# Patient Record
(demographics unavailable — no encounter records)

---

## 2025-03-02 NOTE — PHYSICAL EXAM
[No Acute Distress] : no acute distress [Well Nourished] : well nourished [Well Developed] : well developed [Well-Appearing] : well-appearing [Normal Sclera/Conjunctiva] : normal sclera/conjunctiva [PERRL] : pupils equal round and reactive to light [EOMI] : extraocular movements intact [Normal Outer Ear/Nose] : the outer ears and nose were normal in appearance [Normal Oropharynx] : the oropharynx was normal [No JVD] : no jugular venous distention [No Lymphadenopathy] : no lymphadenopathy [Supple] : supple [Thyroid Normal, No Nodules] : the thyroid was normal and there were no nodules present [No Respiratory Distress] : no respiratory distress  [No Accessory Muscle Use] : no accessory muscle use [Clear to Auscultation] : lungs were clear to auscultation bilaterally [Normal Rate] : normal rate  [Regular Rhythm] : with a regular rhythm [Normal S1, S2] : normal S1 and S2 [No Murmur] : no murmur heard [No Abdominal Bruit] : a ~M bruit was not heard ~T in the abdomen [No Varicosities] : no varicosities [Pedal Pulses Present] : the pedal pulses are present [No Edema] : there was no peripheral edema [No Palpable Aorta] : no palpable aorta [No Extremity Clubbing/Cyanosis] : no extremity clubbing/cyanosis [Soft] : abdomen soft [Non Tender] : non-tender [Non-distended] : non-distended [No Masses] : no abdominal mass palpated [No HSM] : no HSM [Normal Bowel Sounds] : normal bowel sounds [Normal Posterior Cervical Nodes] : no posterior cervical lymphadenopathy [Normal Anterior Cervical Nodes] : no anterior cervical lymphadenopathy [No CVA Tenderness] : no CVA  tenderness [No Spinal Tenderness] : no spinal tenderness [No Joint Swelling] : no joint swelling [Grossly Normal Strength/Tone] : grossly normal strength/tone [No Rash] : no rash [Coordination Grossly Intact] : coordination grossly intact [No Focal Deficits] : no focal deficits [Normal Gait] : normal gait [Deep Tendon Reflexes (DTR)] : deep tendon reflexes were 2+ and symmetric [Normal Affect] : the affect was normal [Normal Insight/Judgement] : insight and judgment were intact [de-identified] : /80  BMI: 25.68

## 2025-03-02 NOTE — HEALTH RISK ASSESSMENT
[Little interest or pleasure doing things] : 1) Little interest or pleasure doing things [Feeling down, depressed, or hopeless] : 2) Feeling down, depressed, or hopeless [0] : 2) Feeling down, depressed, or hopeless: Not at all (0) [PHQ-2 Negative - No further assessment needed] : PHQ-2 Negative - No further assessment needed [PHQ-9 Positive] : PHQ-9 Positive [Time Spent: ___ Minutes] : I spent [unfilled] minutes performing a depression screening for this patient. [[id number]] : 0 [No] : does not take

## 2025-03-02 NOTE — HEALTH RISK ASSESSMENT
[Little interest or pleasure doing things] : 1) Little interest or pleasure doing things [Feeling down, depressed, or hopeless] : 2) Feeling down, depressed, or hopeless [0] : 2) Feeling down, depressed, or hopeless: Not at all (0) [PHQ-2 Negative - No further assessment needed] : PHQ-2 Negative - No further assessment needed [PHQ-9 Positive] : PHQ-9 Positive [Time Spent: ___ Minutes] : I spent [unfilled] minutes performing a depression screening for this patient. [[id number]] : 0 [No] : does not take

## 2025-03-02 NOTE — HISTORY OF PRESENT ILLNESS
[FreeTextEntry1] : Annual Wellness examination [de-identified] : 57 yrs old female with history of Migraines, GEORGES for Graves disease 2009, Hypothyroidism, Osteopenia, hyperlipidemia, Cervical herniated disc, 11/2021 uterine myomectomy, Strong FH Graves disease, here for annual wellness examination.  Sees endocrinology for hypothyroidism. Recent history: 7/14/24: [facility] for acute right pyelonephritis and urethritis with bilateral hydronephrosis. On Rocephin IV and 12 days Cefpodoxime.  CT abd/pelvis 7/14/24 :  Mild proximal bilateral hydroureteronephrosis, with probable right sided pyeloureteritis.  New history: OAB- noticed 7/2024 - 1-2 times up at night and daytime normal. Also drinking a lot more fluid since hospitalization 7/2024. Migraines much improved- every 3 mos or less. Labwork: 2/26/25 , TG 99, GFR 74, , TFTs WNL, A1c 5.8, urine screen neg Allergies: Pcn rash, corn -rash facial Medication :  Synthroid 88 micrograms QD 5 days per week, Sat 1/2 pill, Sunday none. Relpax 40 mg QD PRN migraines, Valtrex PRN cold sores. Vitamins: Multi women's 50+, Fish oil, Caltrate, B complex Vaccines: None SH: Nonsmoker, ETOH social- 1 glass wine 1-2 times per week. Marijuana none. FH: Sister Graves disease. Mother: HTN Colitis. Father: HTN thyroid Graves disease, 82 Cataracts/glaucoma Diet: Healthy Exercise: Cardio, weights  Preventive screening:    Breast imaging : Due 4/2025.  Prior mammogram/sonogram breasts normal   GYN/pelvic imaging: 3/2025 upcoming to discuss OAB. PAP normal.   Bone density: 3/27/24: Osteopenia spine (T-1.7) /femoral neck (T-1.1), normal total left hip   CRC screening: Colonoscopy 10/23/23 Dr Huber normal  Repeat 10/2028   Ophthalmology: Dr Evelio CALDERON 6mos- borderline glaucoma.   Cardiac: No recent EKG: Normal   Labwork: Today  Need for lung cancer screening: Smoker within 15 yrs of 20 +pack yrs/over 50: neg   Dermatology:  No history precancer-due for followup  Dental: 2/2025 good . Goes twice a year   Fall risk: No   Advance directives: No health care proxy

## 2025-03-02 NOTE — PLAN
[FreeTextEntry1] : 57 yrs old female here for annual wellness examination.   Patient had hospitalization 7/2024 at [facility] for acute right pyelonephritis with bilateral hydronephrosis and urethritis.  Patient did not have follow-up radiology testing and has symptoms of OAB.  However, has also been drinking a lot more fluid since hospitalization. Recommend:  Renal and bladder ultrasound as prior CT scan showed bladder thickening as well as above findings. Normal GFR and urine screen on recent labs.   Regarding occasional palpitations, TFTs WNL, EKG WNL today  Due for cardiology followup- referral done. Patient also complaining of LBP-xray ordered. Patient sees endocrinologist regularly for hypothyroidism Preventive testing discussed: Up to date on breast imaging, GYN exams/PAP tests, bone density, Ophthalmology, dental. Dermatology due- referral done Order Corn IGE- to determine extent of corn allergy- Allergy and medication reconciliation done Diet and exercise discussed: Healthy diet and regular cardio/weights RV 1 yrs Patient understands instructions and agrees with plan Will contact pt with results of all testing.

## 2025-03-02 NOTE — PHYSICAL EXAM
[No Acute Distress] : no acute distress [Well Nourished] : well nourished [Well Developed] : well developed [Well-Appearing] : well-appearing [Normal Sclera/Conjunctiva] : normal sclera/conjunctiva [PERRL] : pupils equal round and reactive to light [EOMI] : extraocular movements intact [Normal Outer Ear/Nose] : the outer ears and nose were normal in appearance [Normal Oropharynx] : the oropharynx was normal [No JVD] : no jugular venous distention [No Lymphadenopathy] : no lymphadenopathy [Supple] : supple [Thyroid Normal, No Nodules] : the thyroid was normal and there were no nodules present [No Respiratory Distress] : no respiratory distress  [No Accessory Muscle Use] : no accessory muscle use [Clear to Auscultation] : lungs were clear to auscultation bilaterally [Normal Rate] : normal rate  [Regular Rhythm] : with a regular rhythm [Normal S1, S2] : normal S1 and S2 [No Murmur] : no murmur heard [No Abdominal Bruit] : a ~M bruit was not heard ~T in the abdomen [No Varicosities] : no varicosities [Pedal Pulses Present] : the pedal pulses are present [No Edema] : there was no peripheral edema [No Palpable Aorta] : no palpable aorta [No Extremity Clubbing/Cyanosis] : no extremity clubbing/cyanosis [Soft] : abdomen soft [Non Tender] : non-tender [Non-distended] : non-distended [No Masses] : no abdominal mass palpated [No HSM] : no HSM [Normal Bowel Sounds] : normal bowel sounds [Normal Posterior Cervical Nodes] : no posterior cervical lymphadenopathy [Normal Anterior Cervical Nodes] : no anterior cervical lymphadenopathy [No CVA Tenderness] : no CVA  tenderness [No Spinal Tenderness] : no spinal tenderness [No Joint Swelling] : no joint swelling [Grossly Normal Strength/Tone] : grossly normal strength/tone [No Rash] : no rash [Coordination Grossly Intact] : coordination grossly intact [No Focal Deficits] : no focal deficits [Normal Gait] : normal gait [Deep Tendon Reflexes (DTR)] : deep tendon reflexes were 2+ and symmetric [Normal Affect] : the affect was normal [Normal Insight/Judgement] : insight and judgment were intact [de-identified] : /80  BMI: 25.68

## 2025-03-02 NOTE — PLAN
[FreeTextEntry1] : 57 yrs old female here for annual wellness examination.   Patient had hospitalization 7/2024 at [facility] for acute right pyelonephritis with bilateral hydronephrosis and urethritis.  Patient did not have follow-up radiology testing and has symptoms of OAB.  However, has also been drinking a lot more fluid since hospitalization. Recommend:  Renal and bladder ultrasound as prior CT scan showed bladder thickening as well as above findings. Normal GFR and urine screen on recent labs.   Regarding occasional palpitations, TFTs WNL, EKG WNL today  Due for cardiology followup- referral done. Patient also complaining of LBP-xray ordered. Patient sees endocrinologist regularly for hypothyroidism Preventive testing discussed: Up to date on breast imaging, GYN exams/PAP tests, bone density, Ophthalmology, dental. Dermatology due- referral done Order Corn IGE- to determine extent of corn allergy- Allergy and medication reconciliation done Diet and exercise discussed: Healthy diet and regular cardio/weights RV 1 yrs Patient understands instructions and agrees with plan Will contact pt with results of all testing.

## 2025-03-02 NOTE — HISTORY OF PRESENT ILLNESS
[FreeTextEntry1] : Annual Wellness examination [de-identified] : 57 yrs old female with history of Migraines, [place]ORGES for Graves disease 2009, Hypothyroidism, Osteopenia, hyperlipidemia, Cervical herniated disc, 11/2021 uterine myomectomy, Strong FH Graves disease, here for annual wellness examination.  Sees endocrinology for hypothyroidism. Recent history: 7/14/24: [facility] for acute right pyelonephritis and urethritis with bilateral hydronephrosis. On Rocephin IV and 12 days Cefpodoxime.  CT abd/pelvis 7/14/24 :  Mild proximal bilateral hydroureteronephrosis, with probable right sided pyeloureteritis.  New history: OAB- noticed 7/2024 - 1-2 times up at night and daytime normal. Also drinking a lot more fluid since hospitalization 7/2024. Migraines much improved- every 3 mos or less. Labwork: 2/26/25 , TG 99, GFR 74, , TFTs WNL, A1c 5.8, urine screen neg Allergies: Pcn rash, corn -rash facial Medication :  Synthroid 88 micrograms QD 5 days per week, Sat 1/2 pill, Sunday none. Relpax 40 mg QD PRN migraines, Valtrex PRN cold sores. Vitamins: Multi women's 50+, Fish oil, Caltrate, B complex Vaccines: None SH: Nonsmoker, ETOH social- 1 glass wine 1-2 times per week. Marijuana none. FH: Sister Graves disease. Mother: HTN Colitis. Father: HTN thyroid Graves disease, 82 Cataracts/glaucoma Diet: Healthy Exercise: Cardio, weights  Preventive screening:    Breast imaging : Due 4/2025.  Prior mammogram/sonogram breasts normal   GYN/pelvic imaging: 3/2025 upcoming to discuss OAB. PAP normal.   Bone density: 3/27/24: Osteopenia spine (T-1.7) /femoral neck (T-1.1), normal total left hip   CRC screening: Colonoscopy 10/23/23 Dr Huber normal  Repeat 10/2028   Ophthalmology: Dr Evelio CALDERON 6mos- borderline glaucoma.   Cardiac: No recent EKG: Normal   Labwork: Today  Need for lung cancer screening: Smoker within 15 yrs of 20 +pack yrs/over 50: neg   Dermatology:  No history precancer-due for followup  Dental: 2/2025 good . Goes twice a year   Fall risk: No   Advance directives: No health care proxy

## 2025-03-02 NOTE — REVIEW OF SYSTEMS
[Negative] : Heme/Lymph [FreeTextEntry5] : Occasional palpitattions [FreeTextEntry8] : OAB [FreeTextEntry9] : Occasional LBP

## 2025-03-22 NOTE — PHYSICAL EXAM
[Chaperone Present] : A chaperone was present in the examining room during all aspects of the physical examination [Appropriately responsive] : appropriately responsive [Alert] : alert [No Acute Distress] : no acute distress [No Lymphadenopathy] : no lymphadenopathy [Soft] : soft [Non-tender] : non-tender [Non-distended] : non-distended [Oriented x3] : oriented x3 [Examination Of The Breasts] : a normal appearance [Breast Palpation Diffuse Fibrous Tissue Bilateral] : fibrocystic changes [No Masses] : no breast masses were palpable [Labia Majora] : normal [Labia Minora] : normal [Normal] : normal [Uterine Adnexae] : normal [FreeTextEntry2] : Uzma

## 2025-03-22 NOTE — DISCUSSION/SUMMARY
[FreeTextEntry1] :  GYN Annual evaluation today -pap smear sent We discussed -- Fibrocystic breast, Vaginismus and Bloating causes and treatment options. Patient verbalized understanding of all explanations, and her questions were answered, and all concerns were addressed. Patient was screened for depression - no signs of clinical depression. PHQ-2 on file Rx given for mammogram and B sonogram RTO in 1 year for annual   I, Uzma moffett acting as scribe for Dr. Velasquez. 03/18/2025   The documentation recorded by the scribe, in my presence, accurately reflects the service I personally performed, and the decisions made by me with my edits as appropriate on 03/22/2025  Jessica Velasquez MD, FACOG

## 2025-04-25 NOTE — HISTORY OF PRESENT ILLNESS
[10] : 10 [2] : 2 [Radiating] : radiating [Sharp] : sharp [Shooting] : shooting [Throbbing] : throbbing [Tingling] : tingling [Lying in bed] : lying in bed [de-identified] : 4/25/25:  57 y/o F RHD presenting with 1 week of neck pain. States started after exercising. Pain radiating down RUE with some tingling in wrist and fingers.  Was exercising but no obvious injury.  No loss of fine motor  tried tylenol/tony hernadez   Occ:   No PT/chiro/acupuncture Hx carpal tunnel treated with braces  PMH: Hypothyroidism no hx CA   No hx spine surgery or recent MRI   xrays today: C spine - spondylosis [] : Post Surgical Visit: no [FreeTextEntry1] : neck [FreeTextEntry3] : 1.5 weeks ago [FreeTextEntry5] : pt states bout 1.5 weeks ago she was lifting weights which might have caused pain on her neck, although she is unsure.  [FreeTextEntry6] : numbness, "tight knot" [FreeTextEntry7] : right arm to her elbow  [FreeTextEntry9] : Tylenol, crem [de-identified] : movement

## 2025-04-25 NOTE — DISCUSSION/SUMMARY
[Medication Risks Reviewed] : Medication risks reviewed [de-identified] : reviewed the case and the imaging with the patient  right cervical radic discussion of the condition and treatment options cautions discussed questions answered discussion of natural history of the condition and what the next step would be  MRi PT MDP

## 2025-05-02 NOTE — DISCUSSION/SUMMARY
[Medication Risks Reviewed] : Medication risks reviewed [de-identified] : reviewed the case and the imaging with the patient  mri with mild findings rec PT  if not getting better with PT consider injections with pain doesnt look surgical

## 2025-05-02 NOTE — HISTORY OF PRESENT ILLNESS
[10] : 10 [2] : 2 [Radiating] : radiating [Sharp] : sharp [Shooting] : shooting [Throbbing] : throbbing [Tingling] : tingling [Lying in bed] : lying in bed [de-identified] : 05/02/25: Pt here to f/u for neck pain and to review MRI results done on 04/28/25 at [facility].  4/25/25:  59 y/o F RHD presenting with 1 week of neck pain. States started after exercising. Pain radiating down RUE with some tingling in wrist and fingers.  Was exercising but no obvious injury.  No loss of fine motor  tried tylenol/tony herndaez   Occ:   No PT/chiro/acupuncture Hx carpal tunnel treated with braces  PMH: Hypothyroidism no hx CA   No hx spine surgery or recent MRI   xrays today: C spine - spondylosis  5/2/25:  Here for fu - plan at last was "right cervical radic discussion of the condition and treatment options cautions discussed questions answered discussion of natural history of the condition and what the next step would be  MRi PT MDP"  neck and arm pain remains   MRi c spine - see report - [facility]  by my read- mild spondylosid [] : Post Surgical Visit: no [FreeTextEntry1] : neck [FreeTextEntry3] : 1.5 weeks ago [FreeTextEntry5] : pt states bout 1.5 weeks ago she was lifting weights which might have caused pain on her neck, although she is unsure.  [FreeTextEntry6] : numbness, "tight knot" [FreeTextEntry7] : right arm to her elbow  [FreeTextEntry9] : Tylenol, crem [de-identified] : movement